# Patient Record
Sex: FEMALE | Race: WHITE | NOT HISPANIC OR LATINO | Employment: OTHER | URBAN - METROPOLITAN AREA
[De-identification: names, ages, dates, MRNs, and addresses within clinical notes are randomized per-mention and may not be internally consistent; named-entity substitution may affect disease eponyms.]

---

## 2023-06-05 LAB
ANION GAP IN SER/PLAS: 10 MMOL/L (ref 10–20)
CALCIUM (MG/DL) IN SER/PLAS: 10.4 MG/DL (ref 8.6–10.6)
CARBON DIOXIDE, TOTAL (MMOL/L) IN SER/PLAS: 29 MMOL/L (ref 21–32)
CHLORIDE (MMOL/L) IN SER/PLAS: 104 MMOL/L (ref 98–107)
CREATININE (MG/DL) IN SER/PLAS: 0.72 MG/DL (ref 0.5–1.05)
GFR FEMALE: 82 ML/MIN/1.73M2
GLUCOSE (MG/DL) IN SER/PLAS: 101 MG/DL (ref 74–99)
POTASSIUM (MMOL/L) IN SER/PLAS: 4.4 MMOL/L (ref 3.5–5.3)
SODIUM (MMOL/L) IN SER/PLAS: 139 MMOL/L (ref 136–145)
UREA NITROGEN (MG/DL) IN SER/PLAS: 13 MG/DL (ref 6–23)

## 2023-11-27 RX ORDER — SIMVASTATIN 20 MG/1
1 TABLET, FILM COATED ORAL DAILY
COMMUNITY

## 2023-11-27 RX ORDER — FLUOXETINE HYDROCHLORIDE 40 MG/1
1 CAPSULE ORAL DAILY
COMMUNITY
Start: 2020-01-13

## 2023-11-27 RX ORDER — SACUBITRIL AND VALSARTAN 24; 26 MG/1; MG/1
1 TABLET, FILM COATED ORAL 2 TIMES DAILY
COMMUNITY
Start: 2018-02-28

## 2023-11-27 RX ORDER — ALENDRONATE SODIUM 70 MG/1
70 TABLET ORAL
COMMUNITY

## 2023-11-27 RX ORDER — CHOLECALCIFEROL (VITAMIN D3) 25 MCG
1 TABLET ORAL DAILY
COMMUNITY

## 2023-11-27 RX ORDER — CARVEDILOL 3.12 MG/1
1 TABLET ORAL 2 TIMES DAILY
COMMUNITY

## 2023-11-27 RX ORDER — LEVOTHYROXINE SODIUM 50 UG/1
1 TABLET ORAL DAILY
COMMUNITY

## 2023-11-27 RX ORDER — POLYETHYLENE GLYCOL 3350 17 G/17G
17 POWDER, FOR SOLUTION ORAL 2 TIMES DAILY PRN
COMMUNITY
Start: 2022-05-12 | End: 2024-01-12

## 2023-11-27 RX ORDER — DOCUSATE SODIUM 100 MG/1
CAPSULE, LIQUID FILLED ORAL
COMMUNITY
Start: 2022-05-12

## 2023-11-27 RX ORDER — ASPIRIN 81 MG/1
1 TABLET ORAL DAILY
COMMUNITY

## 2023-12-04 ENCOUNTER — HOSPITAL ENCOUNTER (OUTPATIENT)
Dept: CARDIOLOGY | Facility: CLINIC | Age: 83
Discharge: HOME | End: 2023-12-04
Payer: MEDICARE

## 2023-12-04 DIAGNOSIS — I47.20 VT (VENTRICULAR TACHYCARDIA) (MULTI): ICD-10-CM

## 2023-12-04 DIAGNOSIS — Z95.810 PRESENCE OF AUTOMATIC CARDIOVERTER/DEFIBRILLATOR (AICD): ICD-10-CM

## 2023-12-08 ENCOUNTER — APPOINTMENT (OUTPATIENT)
Dept: CARDIOLOGY | Facility: CLINIC | Age: 83
End: 2023-12-08

## 2023-12-15 ENCOUNTER — APPOINTMENT (OUTPATIENT)
Dept: CARDIOLOGY | Facility: CLINIC | Age: 83
End: 2023-12-15
Payer: MEDICARE

## 2024-01-03 PROBLEM — I34.0 MITRAL VALVE INSUFFICIENCY: Status: ACTIVE | Noted: 2024-01-03

## 2024-01-03 PROBLEM — Z95.818 S/P MITRAL VALVE CLIP IMPLANTATION: Status: ACTIVE | Noted: 2024-01-03

## 2024-01-03 PROBLEM — E78.00 HYPERCHOLESTEROLEMIA: Chronic | Status: ACTIVE | Noted: 2024-01-03

## 2024-01-03 PROBLEM — M81.8: Status: ACTIVE | Noted: 2023-05-25

## 2024-01-03 PROBLEM — E78.00 HYPERCHOLESTEROLEMIA: Status: ACTIVE | Noted: 2024-01-03

## 2024-01-03 PROBLEM — I07.1 TRICUSPID REGURGITATION: Status: ACTIVE | Noted: 2024-01-03

## 2024-01-03 PROBLEM — Z95.810 AICD (AUTOMATIC CARDIOVERTER/DEFIBRILLATOR) PRESENT: Status: ACTIVE | Noted: 2024-01-03

## 2024-01-03 PROBLEM — I42.8 NONISCHEMIC CARDIOMYOPATHY (MULTI): Status: ACTIVE | Noted: 2024-01-03

## 2024-01-03 PROBLEM — Z96.643 STATUS POST TOTAL HIP REPLACEMENT, BILATERAL: Status: ACTIVE | Noted: 2023-05-25

## 2024-01-03 PROBLEM — Z98.890 S/P MITRAL VALVE CLIP IMPLANTATION: Chronic | Status: ACTIVE | Noted: 2024-01-03

## 2024-01-03 PROBLEM — R42 DIZZINESS ON STANDING: Status: ACTIVE | Noted: 2024-01-03

## 2024-01-03 PROBLEM — I25.10 CAD (CORONARY ARTERY DISEASE): Status: ACTIVE | Noted: 2024-01-03

## 2024-01-03 PROBLEM — R82.90 ABNORMAL URINE FINDINGS: Status: ACTIVE | Noted: 2024-01-03

## 2024-01-03 PROBLEM — Z95.818 S/P MITRAL VALVE CLIP IMPLANTATION: Chronic | Status: ACTIVE | Noted: 2024-01-03

## 2024-01-03 PROBLEM — I07.1 TRICUSPID REGURGITATION: Chronic | Status: ACTIVE | Noted: 2024-01-03

## 2024-01-03 PROBLEM — I42.8 NONISCHEMIC CARDIOMYOPATHY (MULTI): Chronic | Status: ACTIVE | Noted: 2024-01-03

## 2024-01-03 PROBLEM — I34.0 MITRAL VALVE INSUFFICIENCY: Chronic | Status: ACTIVE | Noted: 2024-01-03

## 2024-01-03 PROBLEM — I25.10 CAD (CORONARY ARTERY DISEASE): Chronic | Status: ACTIVE | Noted: 2024-01-03

## 2024-01-03 PROBLEM — I50.42: Status: ACTIVE | Noted: 2024-01-03

## 2024-01-03 PROBLEM — Z98.890 S/P MITRAL VALVE CLIP IMPLANTATION: Status: ACTIVE | Noted: 2024-01-03

## 2024-01-12 ENCOUNTER — APPOINTMENT (OUTPATIENT)
Dept: CARDIOLOGY | Facility: CLINIC | Age: 84
End: 2024-01-12
Payer: MEDICARE

## 2024-01-12 ENCOUNTER — OFFICE VISIT (OUTPATIENT)
Dept: CARDIOLOGY | Facility: CLINIC | Age: 84
End: 2024-01-12
Payer: MEDICARE

## 2024-01-12 VITALS
DIASTOLIC BLOOD PRESSURE: 61 MMHG | SYSTOLIC BLOOD PRESSURE: 97 MMHG | WEIGHT: 110 LBS | BODY MASS INDEX: 21.6 KG/M2 | HEART RATE: 73 BPM | OXYGEN SATURATION: 97 % | HEIGHT: 60 IN

## 2024-01-12 DIAGNOSIS — E78.00 HYPERCHOLESTEROLEMIA: Chronic | ICD-10-CM

## 2024-01-12 DIAGNOSIS — I42.8 NONISCHEMIC CARDIOMYOPATHY (MULTI): Chronic | ICD-10-CM

## 2024-01-12 DIAGNOSIS — Z98.890 S/P MITRAL VALVE CLIP IMPLANTATION: Primary | Chronic | ICD-10-CM

## 2024-01-12 DIAGNOSIS — Z95.818 S/P MITRAL VALVE CLIP IMPLANTATION: Primary | Chronic | ICD-10-CM

## 2024-01-12 DIAGNOSIS — Z95.810 AICD (AUTOMATIC CARDIOVERTER/DEFIBRILLATOR) PRESENT: ICD-10-CM

## 2024-01-12 DIAGNOSIS — I34.0 NONRHEUMATIC MITRAL VALVE REGURGITATION: Chronic | ICD-10-CM

## 2024-01-12 DIAGNOSIS — I25.10 CORONARY ARTERY DISEASE INVOLVING NATIVE CORONARY ARTERY OF NATIVE HEART WITHOUT ANGINA PECTORIS: Chronic | ICD-10-CM

## 2024-01-12 PROCEDURE — 1036F TOBACCO NON-USER: CPT | Performed by: INTERNAL MEDICINE

## 2024-01-12 PROCEDURE — 99214 OFFICE O/P EST MOD 30 MIN: CPT | Performed by: INTERNAL MEDICINE

## 2024-01-12 PROCEDURE — 1125F AMNT PAIN NOTED PAIN PRSNT: CPT | Performed by: INTERNAL MEDICINE

## 2024-01-12 PROCEDURE — 1159F MED LIST DOCD IN RCRD: CPT | Performed by: INTERNAL MEDICINE

## 2024-01-12 RX ORDER — SPIRONOLACTONE 25 MG/1
12.5 TABLET ORAL DAILY
COMMUNITY

## 2024-01-12 ASSESSMENT — ENCOUNTER SYMPTOMS
OCCASIONAL FEELINGS OF UNSTEADINESS: 0
DEPRESSION: 0
LOSS OF SENSATION IN FEET: 0

## 2024-01-12 NOTE — PATIENT INSTRUCTIONS
1. Nonischemic cardiomyopathy with an ejection fraction of 30%. 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 No symptoms. Continue Entresto 24/26 twice daily continue carvedilol.  On her last visit May 2023 spironolactone 25 mg started.  Potassium 4.4.  Blood pressure is low.  With dizziness.  Spironolactone decreased to 12.5.  She has been doing quite well with this dose.       2. Hypertension. Blood pressures are excellent.  Potassium 4.4.     3. Mitral regurgitation. On the repeat echo she has a MitraClip. The mitral regurgitation is still moderate.  No specific therapy is required     4. CAD. Mild nonobstructive disease. She is on aspirin, carvedilol, and statins. I reviewed her blood work from 2022 at the [facility] with her primary care doctor. Her LDL was 77 at that time.     5. Ventricular tachycardia. She has a Medtronic AICD. No discharges.     RTC 6 months

## 2024-01-12 NOTE — PROGRESS NOTES
Referred by No ref. provider found    HPI I am seeing Milla in follow-up.  Overall doing well.  I started her on spironolactone 25 mg x 423.  Initially she had dizziness and hypotension.  Spironolactone 12.5 daily and she has been doing great.  No shortness of breath no chest pain no further dizziness.  She still going to the gym 2 to 3 days out of the week.    Past Medical History:  Problem List Items Addressed This Visit    None       Past Medical History:   Diagnosis Date    CAD (coronary artery disease) 01/03/2024    Mild nonobstructive CAD    Hypercholesterolemia 01/03/2024    PCP follows    Mitral valve insufficiency 01/03/2024    Residual mild/moderate MR following MitraClip 3/2019    Nonischemic cardiomyopathy (CMS/HCC) 01/03/2024    EF 25%. Cardiac MRI did not reveal any clear cause. Diffuse scar tissue indicating an indolent process.    S/P mitral valve clip implantation 01/03/2024    MitraClip 3/2019    Tricuspid regurgitation 01/03/2024    Severe              Past Surgical History:  She has a past surgical history that includes Other surgical history (10/19/2022); Other surgical history (10/19/2022); Other surgical history (04/13/2022); Other surgical history (04/13/2022); Other surgical history (04/13/2022); and Other surgical history (04/13/2022).      Social History:  She has no history on file for tobacco use, alcohol use, and drug use.    Family History:  No family history on file.     Allergies:  Bee venom protein (honey bee)    Outpatient Medications:  Current Outpatient Medications   Medication Instructions    alendronate (FOSAMAX) 70 mg, oral, Every 7 days    ascorbic acid (Vitamin C) 100 mg tablet 1 tablet, oral, Daily    aspirin 81 mg EC tablet 1 tablet, oral, Daily    carvedilol (Coreg) 3.125 mg tablet 1 tablet, oral, 2 times daily    cholecalciferol (Vitamin D-3) 25 MCG (1000 UT) tablet 1 tablet, oral, Daily    docusate sodium (Colace) 100 mg capsule oral    FLUoxetine (PROzac) 40 mg  "capsule 1 capsule, oral, Daily    levothyroxine (Synthroid, Levoxyl) 50 mcg tablet 1 tablet, oral, Daily    polyethylene glycol (GLYCOLAX, MIRALAX) 17 g, oral, 2 times daily PRN    sacubitriL-valsartan (Entresto) 24-26 mg tablet 1 tablet, oral, 2 times daily    simvastatin (Zocor) 20 mg tablet 1 tablet, oral, Daily        Last Recorded Vitals:  There were no vitals filed for this visit.    Physical Exam    Physical  Patient is alert and oriented x3.  HEENT is unremarkable mucous members are moist  Neck no JVP no bruits upstrokes are full no thyromegaly  Lungs are clear bilaterally.  No wheezing crackles or rales  Heart regular rhythm normal S1-S2 there is no S3 2/6 HSM  Abdomen is soft vessels are positive nontender nondistended no organomegaly no pulsatile masses  Extremities have no edema.  Distal pulses present palpable.  Neuro is grossly nonfocal  Skin has no rashes     Last Labs:  CBC -  Lab Results   Component Value Date    WBC 9.1 05/12/2022    HGB 10.7 (L) 05/12/2022    HCT 33.6 (L) 05/12/2022    MCV 98 05/12/2022     05/12/2022       CMP -  Lab Results   Component Value Date    CALCIUM 10.4 06/05/2023    PROT 7.4 05/02/2022    ALBUMIN 4.5 05/02/2022    AST 19 05/02/2022    ALT 12 05/02/2022    ALKPHOS 62 05/02/2022    BILITOT 0.7 05/02/2022       LIPID PANEL -   No results found for: \"CHOL\", \"HDL\", \"CHHDL\", \"LDL\", \"VLDL\", \"TRIG\", \"NHDL\"    RENAL FUNCTION PANEL -   Lab Results   Component Value Date    K 4.4 06/05/2023       Lab Results   Component Value Date     (H) 11/29/2017    HGBA1C 5.1 02/27/2019     Procedure  ECHO [04/13/2023]: EF 25-30%. LA mild/mod dial. MitraClip in place. Residual mod MR. Mildly elev RVSP (41.6 mmHg).      ECHO [04/07/2022]: Est EF 35%. MitraClip in place w/mild-mod residual MR and mild functional mitral stenosis. Mild-mod MR. RVSP mildly elev at 33.9 mmHg. Mod TR. Mild AR.     ECHO [03/13/2020]: Est EF 35%. Mod enlarged RV. LA mod dial. RA mod dial. Mild-mod MR. " "RVSP elevated at 50.1 mmHg. Mod TR. Mitral valve clip present. Global hypok.      CALCIUM SCORE Â [11/29/2017]: calcium score 206.23 \"Def.; Mod Plaque\". SM layering bi-lat. basilar effusions (L greater than R). 4-chamber cardiomegaly, w/sm PE          Assessment/Plan   1. Nonischemic cardiomyopathy with an ejection fraction of 30%. 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 No symptoms. Continue Entresto 24/26 twice daily continue carvedilol.  On her last visit May 2023 spironolactone 25 mg started.  Potassium 4.4.  Blood pressure is low.  With dizziness.  Spironolactone decreased to 12.5.  She has been doing quite well with this dose.       2. Hypertension. Blood pressures are excellent.  Potassium 4.4.     3. Mitral regurgitation. On the repeat echo she has a MitraClip. The mitral regurgitation is still moderate.  No specific therapy is required     4. CAD. Mild nonobstructive disease. She is on aspirin, carvedilol, and statins. I reviewed her blood work from 2022 at the [facility] with her primary care doctor. Her LDL was 77 at that time.     5. Ventricular tachycardia. She has a Medtronic AICD. No discharges.     RTC 6 months  Jordon Sam MD     Instructions and follow up    "

## 2024-03-04 ENCOUNTER — HOSPITAL ENCOUNTER (OUTPATIENT)
Dept: CARDIOLOGY | Facility: CLINIC | Age: 84
Discharge: HOME | End: 2024-03-04
Payer: MEDICARE

## 2024-03-04 DIAGNOSIS — I47.20 VT (VENTRICULAR TACHYCARDIA) (MULTI): ICD-10-CM

## 2024-03-04 DIAGNOSIS — Z95.810 AICD (AUTOMATIC CARDIOVERTER/DEFIBRILLATOR) PRESENT: ICD-10-CM

## 2024-03-04 PROCEDURE — 93295 DEV INTERROG REMOTE 1/2/MLT: CPT | Performed by: INTERNAL MEDICINE

## 2024-03-04 PROCEDURE — 93296 REM INTERROG EVL PM/IDS: CPT

## 2024-06-03 ENCOUNTER — HOSPITAL ENCOUNTER (OUTPATIENT)
Dept: CARDIOLOGY | Facility: CLINIC | Age: 84
Discharge: HOME | End: 2024-06-03
Payer: MEDICARE

## 2024-06-03 DIAGNOSIS — Z95.810 AICD (AUTOMATIC CARDIOVERTER/DEFIBRILLATOR) PRESENT: ICD-10-CM

## 2024-06-03 DIAGNOSIS — I47.20 VT (VENTRICULAR TACHYCARDIA) (MULTI): ICD-10-CM

## 2024-06-03 PROCEDURE — 93296 REM INTERROG EVL PM/IDS: CPT

## 2024-07-09 PROBLEM — M25.532 PAIN IN LEFT WRIST: Status: ACTIVE | Noted: 2024-07-09

## 2024-07-09 PROBLEM — M43.17 SPONDYLOLISTHESIS AT L5-S1 LEVEL: Status: ACTIVE | Noted: 2024-05-14

## 2024-07-09 PROBLEM — M16.11 PRIMARY OSTEOARTHRITIS OF RIGHT HIP: Status: ACTIVE | Noted: 2018-04-25

## 2024-07-09 PROBLEM — Z96.642 PRESENCE OF LEFT ARTIFICIAL HIP JOINT: Status: ACTIVE | Noted: 2024-07-09

## 2024-07-09 PROBLEM — M54.50 BACK PAIN, LUMBOSACRAL: Status: ACTIVE | Noted: 2024-05-14

## 2024-07-26 ENCOUNTER — APPOINTMENT (OUTPATIENT)
Dept: CARDIOLOGY | Facility: CLINIC | Age: 84
End: 2024-07-26
Payer: MEDICARE

## 2024-07-26 VITALS
HEART RATE: 80 BPM | WEIGHT: 108.6 LBS | DIASTOLIC BLOOD PRESSURE: 62 MMHG | OXYGEN SATURATION: 98 % | SYSTOLIC BLOOD PRESSURE: 92 MMHG | BODY MASS INDEX: 21.32 KG/M2 | HEIGHT: 60 IN

## 2024-07-26 DIAGNOSIS — I50.42 CHRONIC COMBINED SYSTOLIC AND DIASTOLIC HEART FAILURE, NYHA CLASS 1 (MULTI): ICD-10-CM

## 2024-07-26 DIAGNOSIS — E78.00 HYPERCHOLESTEROLEMIA: Chronic | ICD-10-CM

## 2024-07-26 DIAGNOSIS — I25.10 CORONARY ARTERY DISEASE INVOLVING NATIVE CORONARY ARTERY OF NATIVE HEART WITHOUT ANGINA PECTORIS: Chronic | ICD-10-CM

## 2024-07-26 DIAGNOSIS — I42.8 NONISCHEMIC CARDIOMYOPATHY (MULTI): Chronic | ICD-10-CM

## 2024-07-26 DIAGNOSIS — Z98.890 S/P MITRAL VALVE CLIP IMPLANTATION: Chronic | ICD-10-CM

## 2024-07-26 DIAGNOSIS — Z95.818 S/P MITRAL VALVE CLIP IMPLANTATION: Chronic | ICD-10-CM

## 2024-07-26 DIAGNOSIS — I36.1 NONRHEUMATIC TRICUSPID VALVE REGURGITATION: Chronic | ICD-10-CM

## 2024-07-26 DIAGNOSIS — I34.0 NONRHEUMATIC MITRAL VALVE REGURGITATION: Chronic | ICD-10-CM

## 2024-07-26 DIAGNOSIS — Z95.810 AICD (AUTOMATIC CARDIOVERTER/DEFIBRILLATOR) PRESENT: Primary | ICD-10-CM

## 2024-07-26 PROBLEM — R82.90 ABNORMAL URINE FINDINGS: Status: RESOLVED | Noted: 2024-01-03 | Resolved: 2024-07-26

## 2024-07-26 PROCEDURE — 99214 OFFICE O/P EST MOD 30 MIN: CPT | Performed by: INTERNAL MEDICINE

## 2024-07-26 PROCEDURE — 93005 ELECTROCARDIOGRAM TRACING: CPT | Performed by: INTERNAL MEDICINE

## 2024-07-26 PROCEDURE — 1159F MED LIST DOCD IN RCRD: CPT | Performed by: INTERNAL MEDICINE

## 2024-07-26 PROCEDURE — 1036F TOBACCO NON-USER: CPT | Performed by: INTERNAL MEDICINE

## 2024-07-26 PROCEDURE — 1160F RVW MEDS BY RX/DR IN RCRD: CPT | Performed by: INTERNAL MEDICINE

## 2024-07-26 PROCEDURE — 1126F AMNT PAIN NOTED NONE PRSNT: CPT | Performed by: INTERNAL MEDICINE

## 2024-07-26 RX ORDER — SPIRONOLACTONE 25 MG/1
12.5 TABLET ORAL DAILY
Qty: 45 TABLET | Refills: 3 | Status: SHIPPED | OUTPATIENT
Start: 2024-07-26 | End: 2025-07-26

## 2024-07-26 RX ORDER — CARVEDILOL 3.12 MG/1
3.12 TABLET ORAL 2 TIMES DAILY
Qty: 180 TABLET | Refills: 3 | Status: SHIPPED | OUTPATIENT
Start: 2024-07-26 | End: 2025-07-26

## 2024-07-26 ASSESSMENT — PAIN SCALES - GENERAL: PAINLEVEL: 0-NO PAIN

## 2024-07-26 NOTE — PROGRESS NOTES
Referred by No ref. provider found    HPI  I am seeing Milla for 6-month follow-up.  Overall she is doing well.  Suffering from some back issues.  She still going to the swimming pool.  No chest pain or pressure no lower extreme edema no shortness of breath no defibrillator discharges.  Past Medical History:  Problem List Items Addressed This Visit    None     Past Medical History:   Diagnosis Date    CAD (coronary artery disease) 01/03/2024    Mild nonobstructive CAD    Hypercholesterolemia 01/03/2024    PCP follows    Mitral valve insufficiency 01/03/2024    Residual mild/moderate MR following MitraClip 3/2019    Nonischemic cardiomyopathy (Multi) 01/03/2024    EF 25%. Cardiac MRI did not reveal any clear cause. Diffuse scar tissue indicating an indolent process.    S/P mitral valve clip implantation 01/03/2024    MitraClip 3/2019    Tricuspid regurgitation 01/03/2024    Severe          Past Surgical History:  She has a past surgical history that includes Other surgical history (10/19/2022); Other surgical history (10/19/2022); Other surgical history (04/13/2022); Other surgical history (04/13/2022); Other surgical history (04/13/2022); and Other surgical history (04/13/2022).      Social History:  She reports that she has never smoked. She has never used smokeless tobacco. No history on file for alcohol use and drug use.    Family History:  No family history on file.     Allergies:  Bee venom protein (honey bee)    Outpatient Medications:  Current Outpatient Medications   Medication Instructions    alendronate (FOSAMAX) 70 mg, oral, Every 7 days    ascorbic acid (Vitamin C) 100 mg tablet 1 tablet, oral, Daily    aspirin 81 mg EC tablet 1 tablet, oral, Daily    carvedilol (Coreg) 3.125 mg tablet 1 tablet, oral, 2 times daily    cholecalciferol (Vitamin D-3) 25 MCG (1000 UT) tablet 1 tablet, oral, Daily    docusate sodium (Colace) 100 mg capsule oral    FLUoxetine (PROzac) 40 mg capsule 1 capsule, oral, Daily     "levothyroxine (Synthroid, Levoxyl) 50 mcg tablet 1 tablet, oral, Daily    sacubitriL-valsartan (Entresto) 24-26 mg tablet 1 tablet, oral, 2 times daily    simvastatin (Zocor) 20 mg tablet 1 tablet, oral, Daily    spironolactone (ALDACTONE) 12.5 mg, oral, Daily     Last Recorded Vitals:  There were no vitals filed for this visit.    Physical Exam  Patient is alert and oriented x3.  HEENT is unremarkable mucous members are moist  Neck no JVP no bruits upstrokes are full no thyromegaly  Lungs are clear bilaterally.  No wheezing crackles or rales  Heart regular rhythm normal S1-S2 there is no S3 2/6 HSM  Abdomen is soft bs are positive nontender nondistended no organomegaly no pulsatile masses  Extremities have no edema.  Distal pulses present palpable.  Neuro is grossly nonfocal  Skin has no rashes     Last Labs:  CBC -  Lab Results   Component Value Date    WBC 9.1 05/12/2022    HGB 10.7 (L) 05/12/2022    HCT 33.6 (L) 05/12/2022    MCV 98 05/12/2022     05/12/2022     CMP -  Lab Results   Component Value Date    CALCIUM 10.4 06/05/2023    PROT 7.4 05/02/2022    ALBUMIN 4.5 05/02/2022    AST 19 05/02/2022    ALT 12 05/02/2022    ALKPHOS 62 05/02/2022    BILITOT 0.7 05/02/2022     LIPID PANEL -   No results found for: \"CHOL\", \"HDL\", \"CHHDL\", \"LDL\", \"VLDL\", \"TRIG\", \"NHDL\"    RENAL FUNCTION PANEL -   Lab Results   Component Value Date    K 4.4 06/05/2023     Lab Results   Component Value Date     (H) 11/29/2017    HGBA1C 5.1 02/27/2019     Procedure  ECHO [04/13/2023]: EF 25-30%. LA mild/mod dial. MitraClip in place. Residual mod MR. Mildly elev RVSP (41.6 mmHg).      ECHO [04/07/2022]: Est EF 35%. MitraClip in place w/mild-mod residual MR and mild functional mitral stenosis. Mild-mod MR. RVSP mildly elev at 33.9 mmHg. Mod TR. Mild AR.     ECHO [03/13/2020]: Est EF 35%. Mod enlarged RV. LA mod dial. RA mod dial. Mild-mod MR. RVSP elevated at 50.1 mmHg. Mod TR. Mitral valve clip present. Global hypok.    " "  CALCIUM SCORE Â [11/29/2017]: calcium score 206.23 \"Def.; Mod Plaque\". SM layering bi-lat. basilar effusions (L greater than R). 4-chamber cardiomegaly, w/sm PE        Assessment/Plan   1. Nonischemic cardiomyopathy with an ejection fraction of 30%. 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 No symptoms. Continue Entresto 24/26 twice daily continue carvedilol.  Continue with low-dose spironolactone 12.5 mg daily.  Her potassium is fine.  Her blood pressure is soft.  She is not having any dizziness.    2. Hypertension. Blood pressures are excellent.  Potassium 4.4.     3. Mitral regurgitation. On the repeat echo she has a MitraClip. The mitral regurgitation is still moderate.  No specific therapy is required.  Her last echo April 2023.     4. CAD. Mild nonobstructive disease. She is on aspirin, carvedilol, and statins. I reviewed her blood work from 2022 at the [facility] with her primary care doctor. Her LDL was 77 at that time.     5. Ventricular tachycardia. She has a Medtronic AICD. No discharges.    EKG today.  Return in 6 months.     Jordon Sam MD     Instructions and follow up    "

## 2024-07-26 NOTE — PATIENT INSTRUCTIONS
1. Nonischemic cardiomyopathy with an ejection fraction of 30%. 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 No symptoms. Continue Entresto 24/26 twice daily continue carvedilol.  Continue with low-dose spironolactone 12.5 mg daily.  Her potassium is fine.  Her blood pressure is soft.  She is not having any dizziness.    2. Hypertension. Blood pressures are excellent.  Potassium 4.4.     3. Mitral regurgitation. On the repeat echo she has a MitraClip. The mitral regurgitation is still moderate.  No specific therapy is required.  Her last echo April 2023.     4. CAD. Mild nonobstructive disease. She is on aspirin, carvedilol, and statins. I reviewed her blood work from 2022 at the [facility] with her primary care doctor. Her LDL was 77 at that time.     5. Ventricular tachycardia. She has a Medtronic AICD. No discharges.    EKG today.  Return in 6 months.

## 2024-07-27 LAB
ATRIAL RATE: 73 BPM
P AXIS: 57 DEGREES
P OFFSET: 207 MS
P ONSET: 147 MS
PR INTERVAL: 158 MS
Q ONSET: 226 MS
QRS COUNT: 12 BEATS
QRS DURATION: 84 MS
QT INTERVAL: 434 MS
QTC CALCULATION(BAZETT): 478 MS
QTC FREDERICIA: 463 MS
R AXIS: -56 DEGREES
T AXIS: 56 DEGREES
T OFFSET: 443 MS
VENTRICULAR RATE: 73 BPM

## 2024-09-03 ENCOUNTER — HOSPITAL ENCOUNTER (OUTPATIENT)
Dept: CARDIOLOGY | Facility: CLINIC | Age: 84
Discharge: HOME | End: 2024-09-03
Payer: MEDICARE

## 2024-09-03 DIAGNOSIS — Z95.810 AICD (AUTOMATIC CARDIOVERTER/DEFIBRILLATOR) PRESENT: ICD-10-CM

## 2024-09-03 DIAGNOSIS — I47.20 VT (VENTRICULAR TACHYCARDIA) (MULTI): ICD-10-CM

## 2024-09-03 PROCEDURE — 93296 REM INTERROG EVL PM/IDS: CPT

## 2024-09-04 DIAGNOSIS — I42.8 NONISCHEMIC CARDIOMYOPATHY (MULTI): ICD-10-CM

## 2024-09-04 RX ORDER — SACUBITRIL AND VALSARTAN 24; 26 MG/1; MG/1
1 TABLET, FILM COATED ORAL 2 TIMES DAILY
Qty: 180 TABLET | Refills: 3 | Status: SHIPPED | OUTPATIENT
Start: 2024-09-04

## 2024-12-02 ENCOUNTER — HOSPITAL ENCOUNTER (OUTPATIENT)
Dept: CARDIOLOGY | Facility: CLINIC | Age: 84
Discharge: HOME | End: 2024-12-02
Payer: MEDICARE

## 2024-12-02 DIAGNOSIS — Z95.810 AICD (AUTOMATIC CARDIOVERTER/DEFIBRILLATOR) PRESENT: ICD-10-CM

## 2024-12-02 DIAGNOSIS — I47.20 VT (VENTRICULAR TACHYCARDIA) (MULTI): ICD-10-CM

## 2025-02-21 ENCOUNTER — APPOINTMENT (OUTPATIENT)
Dept: CARDIOLOGY | Facility: CLINIC | Age: 85
End: 2025-02-21
Payer: MEDICARE

## 2025-02-28 ENCOUNTER — APPOINTMENT (OUTPATIENT)
Dept: CARDIOLOGY | Facility: CLINIC | Age: 85
End: 2025-02-28
Payer: MEDICARE

## 2025-06-02 ENCOUNTER — HOSPITAL ENCOUNTER (OUTPATIENT)
Dept: CARDIOLOGY | Facility: CLINIC | Age: 85
Discharge: HOME | End: 2025-06-02
Payer: MEDICARE

## 2025-06-02 DIAGNOSIS — Z95.810 AICD (AUTOMATIC CARDIOVERTER/DEFIBRILLATOR) PRESENT: ICD-10-CM

## 2025-06-02 DIAGNOSIS — I47.20 VT (VENTRICULAR TACHYCARDIA) (MULTI): ICD-10-CM

## 2025-06-02 PROCEDURE — 93296 REM INTERROG EVL PM/IDS: CPT

## 2025-06-02 PROCEDURE — 93295 DEV INTERROG REMOTE 1/2/MLT: CPT | Performed by: INTERNAL MEDICINE

## 2025-06-23 PROBLEM — F32.A DEPRESSION: Status: ACTIVE | Noted: 2025-06-23

## 2025-06-27 ENCOUNTER — OFFICE VISIT (OUTPATIENT)
Dept: CARDIOLOGY | Facility: CLINIC | Age: 85
End: 2025-06-27
Payer: MEDICARE

## 2025-06-27 VITALS
WEIGHT: 108.8 LBS | BODY MASS INDEX: 21.36 KG/M2 | OXYGEN SATURATION: 98 % | HEART RATE: 74 BPM | SYSTOLIC BLOOD PRESSURE: 123 MMHG | DIASTOLIC BLOOD PRESSURE: 77 MMHG | HEIGHT: 60 IN

## 2025-06-27 DIAGNOSIS — Z95.810 AICD (AUTOMATIC CARDIOVERTER/DEFIBRILLATOR) PRESENT: Primary | ICD-10-CM

## 2025-06-27 DIAGNOSIS — I25.10 CORONARY ARTERY DISEASE INVOLVING NATIVE CORONARY ARTERY OF NATIVE HEART WITHOUT ANGINA PECTORIS: Chronic | ICD-10-CM

## 2025-06-27 DIAGNOSIS — Z98.890 S/P MITRAL VALVE CLIP IMPLANTATION: Chronic | ICD-10-CM

## 2025-06-27 DIAGNOSIS — I42.8 NONISCHEMIC CARDIOMYOPATHY (MULTI): Chronic | ICD-10-CM

## 2025-06-27 DIAGNOSIS — I34.0 NONRHEUMATIC MITRAL VALVE REGURGITATION: Chronic | ICD-10-CM

## 2025-06-27 DIAGNOSIS — Z95.818 S/P MITRAL VALVE CLIP IMPLANTATION: Chronic | ICD-10-CM

## 2025-06-27 DIAGNOSIS — E78.00 HYPERCHOLESTEROLEMIA: Chronic | ICD-10-CM

## 2025-06-27 PROCEDURE — 1036F TOBACCO NON-USER: CPT | Performed by: INTERNAL MEDICINE

## 2025-06-27 PROCEDURE — 93005 ELECTROCARDIOGRAM TRACING: CPT | Performed by: INTERNAL MEDICINE

## 2025-06-27 PROCEDURE — 99214 OFFICE O/P EST MOD 30 MIN: CPT | Performed by: INTERNAL MEDICINE

## 2025-06-27 PROCEDURE — 1160F RVW MEDS BY RX/DR IN RCRD: CPT | Performed by: INTERNAL MEDICINE

## 2025-06-27 PROCEDURE — 99212 OFFICE O/P EST SF 10 MIN: CPT

## 2025-06-27 PROCEDURE — 1159F MED LIST DOCD IN RCRD: CPT | Performed by: INTERNAL MEDICINE

## 2025-06-27 PROCEDURE — 1126F AMNT PAIN NOTED NONE PRSNT: CPT | Performed by: INTERNAL MEDICINE

## 2025-06-27 RX ORDER — CARVEDILOL 3.12 MG/1
3.12 TABLET ORAL 2 TIMES DAILY
Qty: 180 TABLET | Refills: 3 | Status: SHIPPED | OUTPATIENT
Start: 2025-06-27 | End: 2026-06-27

## 2025-06-27 ASSESSMENT — PAIN SCALES - GENERAL: PAINLEVEL_OUTOF10: 0-NO PAIN

## 2025-06-27 NOTE — PATIENT INSTRUCTIONS
1. Nonischemic cardiomyopathy with an ejection fraction of 30%. 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 No symptoms. Continue Entresto 24/26 twice daily continue carvedilol.  Continue with low-dose spironolactone 12.5 mg daily.  Her potassium is fine.  Her blood pressure is soft.     2. Hypertension. Blood pressures are excellent.  Potassium 4.4.     3. Mitral regurgitation. On the repeat echo she has a MitraClip. The mitral regurgitation is still moderate.  No specific therapy is required.  Her last echo April 2023. Repeat echo in Oct 2025     4. CAD. Mild nonobstructive disease. She is on aspirin, carvedilol, and statins. I reviewed her blood work from 2022 at the [facility] with her primary care doctor. Her LDL was 77 at that time. No angina     5. Ventricular tachycardia. She has a Medtronic AICD. No discharges.    EKG today.  Return in 6 months. Echo 10/2025

## 2025-06-27 NOTE — PROGRESS NOTES
Referred by No ref. provider found    ELVA Loyola is here for yearly follow up.  Still exercising 3x/wk . Feeling well. No CP/sOB.   Past Medical History:  Problem List Items Addressed This Visit    None     Past Medical History:   Diagnosis Date    CAD (coronary artery disease) 01/03/2024    Mild nonobstructive CAD    Hypercholesterolemia 01/03/2024    PCP follows    Mitral valve insufficiency 01/03/2024    Residual mild/moderate MR following MitraClip 3/2019    Nonischemic cardiomyopathy (Multi) 01/03/2024    EF 25%. Cardiac MRI did not reveal any clear cause. Diffuse scar tissue indicating an indolent process.    S/P mitral valve clip implantation 01/03/2024    MitraClip 3/2019    Tricuspid regurgitation 01/03/2024    Severe       Past Surgical History:  She has a past surgical history that includes Other surgical history (10/19/2022); Other surgical history (10/19/2022); Other surgical history (04/13/2022); Other surgical history (04/13/2022); Other surgical history (04/13/2022); and Other surgical history (04/13/2022).      Social History:  She reports that she has never smoked. She has quit using smokeless tobacco. She reports current alcohol use of about 1.0 standard drink of alcohol per week. She reports that she does not use drugs.    Family History:  Family History   Problem Relation Name Age of Onset    Abnormal EKG Sister  60 - 69     Allergies:  Bee venom protein (honey bee)    Outpatient Medications:  Current Outpatient Medications   Medication Instructions    alendronate (FOSAMAX) 70 mg, oral, Every 7 days    ascorbic acid (Vitamin C) 100 mg tablet 1 tablet, oral, Daily    aspirin 81 mg EC tablet 1 tablet, oral, Daily    carvedilol (COREG) 3.125 mg, oral, 2 times daily    cholecalciferol (Vitamin D-3) 25 MCG (1000 UT) tablet 1 tablet, oral, Daily    docusate sodium (Colace) 100 mg capsule oral    FLUoxetine (PROzac) 40 mg capsule 1 capsule, oral, Daily    levothyroxine (Synthroid, Levoxyl) 50 mcg  "tablet 1 tablet, oral, Daily    sacubitriL-valsartan (Entresto) 24-26 mg tablet 1 tablet, oral, 2 times daily    simvastatin (Zocor) 20 mg tablet 1 tablet, oral, Daily    spironolactone (ALDACTONE) 12.5 mg, oral, Daily     Last Recorded Vitals:  There were no vitals filed for this visit.    Physical Exam  Patient is alert and oriented x3.  HEENT is unremarkable mucous members are moist  Neck no JVP no bruits upstrokes are full no thyromegaly  Lungs are clear bilaterally.  No wheezing crackles or rales  Heart regular rhythm normal S1-S2 there is no S3 2/6 HSM  Abdomen is soft bs are positive nontender nondistended no organomegaly no pulsatile masses  Extremities have no edema.  Distal pulses present palpable.  Neuro is grossly nonfocal  Skin has no rashes     Last Labs:  CBC -  Lab Results   Component Value Date    WBC 9.1 05/12/2022    HGB 10.7 (L) 05/12/2022    HCT 33.6 (L) 05/12/2022    MCV 98 05/12/2022     05/12/2022     CMP -  Lab Results   Component Value Date    CALCIUM 10.4 06/05/2023    PROT 7.4 05/02/2022    ALBUMIN 4.5 05/02/2022    AST 19 05/02/2022    ALT 12 05/02/2022    ALKPHOS 62 05/02/2022    BILITOT 0.7 05/02/2022     LIPID PANEL -   No results found for: \"CHOL\", \"HDL\", \"CHHDL\", \"LDL\", \"VLDL\", \"TRIG\", \"NHDL\"    RENAL FUNCTION PANEL -   Lab Results   Component Value Date    K 4.4 06/05/2023     Lab Results   Component Value Date     (H) 11/29/2017    HGBA1C 5.1 02/27/2019     Procedure  ECHO [04/13/2023]: EF 25-30%. LA mild/mod dial. MitraClip in place. Residual mod MR. Mildly elev RVSP (41.6 mmHg).      ECHO [04/07/2022]: Est EF 35%. MitraClip in place w/mild-mod residual MR and mild functional mitral stenosis. Mild-mod MR. RVSP mildly elev at 33.9 mmHg. Mod TR. Mild AR.     ECHO [03/13/2020]: Est EF 35%. Mod enlarged RV. LA mod dial. RA mod dial. Mild-mod MR. RVSP elevated at 50.1 mmHg. Mod TR. Mitral valve clip present. Global hypok.      CALCIUM SCORE Â [11/29/2017]: calcium score " "206.23 \"Def.; Mod Plaque\". SM layering bi-lat. basilar effusions (L greater than R). 4-chamber cardiomegaly, w/sm PE        Assessment/Plan   1. Nonischemic cardiomyopathy with an ejection fraction of 30%. 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 No symptoms. Continue Entresto 24/26 twice daily continue carvedilol.  Continue with low-dose spironolactone 12.5 mg daily.  Her potassium is fine.  Her blood pressure is soft.     2. Hypertension. Blood pressures are excellent.  Potassium 4.4.     3. Mitral regurgitation. On the repeat echo she has a MitraClip. The mitral regurgitation is still moderate.  No specific therapy is required.  Her last echo April 2023. Repeat echo in Oct 2025     4. CAD. Mild nonobstructive disease. She is on aspirin, carvedilol, and statins. I reviewed her blood work from 2022 at the [facility] with her primary care doctor. Her LDL was 77 at that time. No angina     5. Ventricular tachycardia. She has a Medtronic AICD. No discharges.    EKG today.  Return in 6 months. Echo 10/2025     Jordon Sam MD     Instructions and follow up    "

## 2025-07-05 LAB
ATRIAL RATE: 69 BPM
P AXIS: 43 DEGREES
P OFFSET: 183 MS
P ONSET: 127 MS
PR INTERVAL: 180 MS
Q ONSET: 217 MS
QRS COUNT: 12 BEATS
QRS DURATION: 88 MS
QT INTERVAL: 432 MS
QTC CALCULATION(BAZETT): 462 MS
QTC FREDERICIA: 452 MS
R AXIS: -50 DEGREES
T AXIS: 27 DEGREES
T OFFSET: 433 MS
VENTRICULAR RATE: 69 BPM

## 2025-07-28 DIAGNOSIS — I34.0 NONRHEUMATIC MITRAL VALVE REGURGITATION: Chronic | ICD-10-CM

## 2025-07-28 DIAGNOSIS — I25.10 CORONARY ARTERY DISEASE INVOLVING NATIVE CORONARY ARTERY OF NATIVE HEART WITHOUT ANGINA PECTORIS: Chronic | ICD-10-CM

## 2025-07-29 RX ORDER — SPIRONOLACTONE 25 MG/1
12.5 TABLET ORAL DAILY
Qty: 45 TABLET | Refills: 3 | Status: SHIPPED | OUTPATIENT
Start: 2025-07-29

## 2025-09-02 ENCOUNTER — HOSPITAL ENCOUNTER (OUTPATIENT)
Dept: CARDIOLOGY | Facility: CLINIC | Age: 85
Discharge: HOME | End: 2025-09-02
Payer: MEDICARE

## 2025-09-02 DIAGNOSIS — I47.20 VT (VENTRICULAR TACHYCARDIA) (MULTI): ICD-10-CM

## 2025-09-02 DIAGNOSIS — Z95.810 AICD (AUTOMATIC CARDIOVERTER/DEFIBRILLATOR) PRESENT: ICD-10-CM

## 2025-09-02 PROCEDURE — 93296 REM INTERROG EVL PM/IDS: CPT

## 2025-09-02 PROCEDURE — 93295 DEV INTERROG REMOTE 1/2/MLT: CPT | Performed by: INTERNAL MEDICINE

## 2026-01-22 ENCOUNTER — APPOINTMENT (OUTPATIENT)
Dept: CARDIOLOGY | Facility: CLINIC | Age: 86
End: 2026-01-22
Payer: MEDICARE